# Patient Record
Sex: FEMALE | Race: WHITE | NOT HISPANIC OR LATINO | URBAN - METROPOLITAN AREA
[De-identification: names, ages, dates, MRNs, and addresses within clinical notes are randomized per-mention and may not be internally consistent; named-entity substitution may affect disease eponyms.]

---

## 2018-06-04 ENCOUNTER — EMERGENCY (EMERGENCY)
Facility: HOSPITAL | Age: 38
LOS: 1 days | Discharge: HOME | End: 2018-06-04
Attending: EMERGENCY MEDICINE | Admitting: EMERGENCY MEDICINE
Payer: COMMERCIAL

## 2018-06-04 VITALS
RESPIRATION RATE: 16 BRPM | DIASTOLIC BLOOD PRESSURE: 90 MMHG | WEIGHT: 132.28 LBS | SYSTOLIC BLOOD PRESSURE: 142 MMHG | OXYGEN SATURATION: 99 % | TEMPERATURE: 98 F | HEART RATE: 78 BPM | HEIGHT: 64 IN

## 2018-06-04 VITALS
DIASTOLIC BLOOD PRESSURE: 88 MMHG | RESPIRATION RATE: 18 BRPM | OXYGEN SATURATION: 98 % | HEART RATE: 72 BPM | TEMPERATURE: 98 F | SYSTOLIC BLOOD PRESSURE: 126 MMHG

## 2018-06-04 DIAGNOSIS — R10.9 UNSPECIFIED ABDOMINAL PAIN: ICD-10-CM

## 2018-06-04 DIAGNOSIS — Z79.899 OTHER LONG TERM (CURRENT) DRUG THERAPY: ICD-10-CM

## 2018-06-04 DIAGNOSIS — R10.31 RIGHT LOWER QUADRANT PAIN: ICD-10-CM

## 2018-06-04 DIAGNOSIS — R11.10 VOMITING, UNSPECIFIED: ICD-10-CM

## 2018-06-04 DIAGNOSIS — Z98.890 OTHER SPECIFIED POSTPROCEDURAL STATES: ICD-10-CM

## 2018-06-04 LAB
ALBUMIN SERPL ELPH-MCNC: 4.5 G/DL — SIGNIFICANT CHANGE UP (ref 3.3–5)
ALP SERPL-CCNC: 47 U/L — SIGNIFICANT CHANGE UP (ref 40–120)
ALT FLD-CCNC: 9 U/L — LOW (ref 10–45)
ANION GAP SERPL CALC-SCNC: 11 MMOL/L — SIGNIFICANT CHANGE UP (ref 5–17)
APPEARANCE UR: CLEAR — SIGNIFICANT CHANGE UP
APTT BLD: 32.7 SEC — SIGNIFICANT CHANGE UP (ref 27.5–37.4)
AST SERPL-CCNC: 24 U/L — SIGNIFICANT CHANGE UP (ref 10–40)
BASOPHILS NFR BLD AUTO: 0.1 % — SIGNIFICANT CHANGE UP (ref 0–2)
BILIRUB SERPL-MCNC: 0.2 MG/DL — SIGNIFICANT CHANGE UP (ref 0.2–1.2)
BILIRUB UR-MCNC: NEGATIVE — SIGNIFICANT CHANGE UP
BLD GP AB SCN SERPL QL: NEGATIVE — SIGNIFICANT CHANGE UP
BUN SERPL-MCNC: 7 MG/DL — SIGNIFICANT CHANGE UP (ref 7–23)
CALCIUM SERPL-MCNC: 9.8 MG/DL — SIGNIFICANT CHANGE UP (ref 8.4–10.5)
CHLORIDE SERPL-SCNC: 100 MMOL/L — SIGNIFICANT CHANGE UP (ref 96–108)
CO2 SERPL-SCNC: 27 MMOL/L — SIGNIFICANT CHANGE UP (ref 22–31)
COLOR SPEC: YELLOW — SIGNIFICANT CHANGE UP
CREAT SERPL-MCNC: 0.62 MG/DL — SIGNIFICANT CHANGE UP (ref 0.5–1.3)
DIFF PNL FLD: NEGATIVE — SIGNIFICANT CHANGE UP
EOSINOPHIL NFR BLD AUTO: 0.2 % — SIGNIFICANT CHANGE UP (ref 0–6)
GLUCOSE SERPL-MCNC: 115 MG/DL — HIGH (ref 70–99)
GLUCOSE UR QL: NEGATIVE — SIGNIFICANT CHANGE UP
HCT VFR BLD CALC: 41.7 % — SIGNIFICANT CHANGE UP (ref 34.5–45)
HGB BLD-MCNC: 13.7 G/DL — SIGNIFICANT CHANGE UP (ref 11.5–15.5)
INR BLD: 0.99 — SIGNIFICANT CHANGE UP (ref 0.88–1.16)
KETONES UR-MCNC: NEGATIVE — SIGNIFICANT CHANGE UP
LEUKOCYTE ESTERASE UR-ACNC: NEGATIVE — SIGNIFICANT CHANGE UP
LIDOCAIN IGE QN: 19 U/L — SIGNIFICANT CHANGE UP (ref 7–60)
LYMPHOCYTES # BLD AUTO: 9.9 % — LOW (ref 13–44)
MCHC RBC-ENTMCNC: 28.8 PG — SIGNIFICANT CHANGE UP (ref 27–34)
MCHC RBC-ENTMCNC: 32.9 G/DL — SIGNIFICANT CHANGE UP (ref 32–36)
MCV RBC AUTO: 87.8 FL — SIGNIFICANT CHANGE UP (ref 80–100)
MONOCYTES NFR BLD AUTO: 7.8 % — SIGNIFICANT CHANGE UP (ref 2–14)
NEUTROPHILS NFR BLD AUTO: 82 % — HIGH (ref 43–77)
NITRITE UR-MCNC: NEGATIVE — SIGNIFICANT CHANGE UP
PH UR: 7 — SIGNIFICANT CHANGE UP (ref 5–8)
PLATELET # BLD AUTO: 336 K/UL — SIGNIFICANT CHANGE UP (ref 150–400)
POTASSIUM SERPL-MCNC: 4.4 MMOL/L — SIGNIFICANT CHANGE UP (ref 3.5–5.3)
POTASSIUM SERPL-SCNC: 4.4 MMOL/L — SIGNIFICANT CHANGE UP (ref 3.5–5.3)
PROT SERPL-MCNC: 7.6 G/DL — SIGNIFICANT CHANGE UP (ref 6–8.3)
PROT UR-MCNC: 30 MG/DL
PROTHROM AB SERPL-ACNC: 11 SEC — SIGNIFICANT CHANGE UP (ref 9.8–12.7)
RBC # BLD: 4.75 M/UL — SIGNIFICANT CHANGE UP (ref 3.8–5.2)
RBC # FLD: 17.7 % — HIGH (ref 10.3–16.9)
RH IG SCN BLD-IMP: POSITIVE — SIGNIFICANT CHANGE UP
SODIUM SERPL-SCNC: 138 MMOL/L — SIGNIFICANT CHANGE UP (ref 135–145)
SP GR SPEC: 1.01 — SIGNIFICANT CHANGE UP (ref 1–1.03)
UROBILINOGEN FLD QL: 1 E.U./DL — SIGNIFICANT CHANGE UP
WBC # BLD: 12.6 K/UL — HIGH (ref 3.8–10.5)
WBC # FLD AUTO: 12.6 K/UL — HIGH (ref 3.8–10.5)

## 2018-06-04 PROCEDURE — 83690 ASSAY OF LIPASE: CPT

## 2018-06-04 PROCEDURE — 36415 COLL VENOUS BLD VENIPUNCTURE: CPT

## 2018-06-04 PROCEDURE — 85025 COMPLETE CBC W/AUTO DIFF WBC: CPT

## 2018-06-04 PROCEDURE — 96374 THER/PROPH/DIAG INJ IV PUSH: CPT | Mod: XU

## 2018-06-04 PROCEDURE — 99284 EMERGENCY DEPT VISIT MOD MDM: CPT | Mod: 25

## 2018-06-04 PROCEDURE — 86901 BLOOD TYPING SEROLOGIC RH(D): CPT

## 2018-06-04 PROCEDURE — 80053 COMPREHEN METABOLIC PANEL: CPT

## 2018-06-04 PROCEDURE — 86900 BLOOD TYPING SEROLOGIC ABO: CPT

## 2018-06-04 PROCEDURE — 86850 RBC ANTIBODY SCREEN: CPT

## 2018-06-04 PROCEDURE — 99285 EMERGENCY DEPT VISIT HI MDM: CPT

## 2018-06-04 PROCEDURE — 96375 TX/PRO/DX INJ NEW DRUG ADDON: CPT

## 2018-06-04 PROCEDURE — 74177 CT ABD & PELVIS W/CONTRAST: CPT | Mod: 26

## 2018-06-04 PROCEDURE — 85730 THROMBOPLASTIN TIME PARTIAL: CPT

## 2018-06-04 PROCEDURE — 85610 PROTHROMBIN TIME: CPT

## 2018-06-04 PROCEDURE — 74177 CT ABD & PELVIS W/CONTRAST: CPT

## 2018-06-04 PROCEDURE — 96376 TX/PRO/DX INJ SAME DRUG ADON: CPT

## 2018-06-04 PROCEDURE — 81001 URINALYSIS AUTO W/SCOPE: CPT

## 2018-06-04 RX ORDER — MORPHINE SULFATE 50 MG/1
4 CAPSULE, EXTENDED RELEASE ORAL ONCE
Qty: 0 | Refills: 0 | Status: DISCONTINUED | OUTPATIENT
Start: 2018-06-04 | End: 2018-06-04

## 2018-06-04 RX ORDER — SODIUM CHLORIDE 9 MG/ML
1000 INJECTION INTRAMUSCULAR; INTRAVENOUS; SUBCUTANEOUS
Qty: 0 | Refills: 0 | Status: DISCONTINUED | OUTPATIENT
Start: 2018-06-04 | End: 2018-06-08

## 2018-06-04 RX ORDER — SODIUM CHLORIDE 9 MG/ML
1000 INJECTION INTRAMUSCULAR; INTRAVENOUS; SUBCUTANEOUS ONCE
Qty: 0 | Refills: 0 | Status: COMPLETED | OUTPATIENT
Start: 2018-06-04 | End: 2018-06-04

## 2018-06-04 RX ORDER — IOHEXOL 300 MG/ML
50 INJECTION, SOLUTION INTRAVENOUS ONCE
Qty: 0 | Refills: 0 | Status: COMPLETED | OUTPATIENT
Start: 2018-06-04 | End: 2018-06-04

## 2018-06-04 RX ORDER — ACETAMINOPHEN 500 MG
650 TABLET ORAL ONCE
Qty: 0 | Refills: 0 | Status: COMPLETED | OUTPATIENT
Start: 2018-06-04 | End: 2018-06-04

## 2018-06-04 RX ORDER — ONDANSETRON 8 MG/1
4 TABLET, FILM COATED ORAL ONCE
Qty: 0 | Refills: 0 | Status: COMPLETED | OUTPATIENT
Start: 2018-06-04 | End: 2018-06-04

## 2018-06-04 RX ORDER — TAMSULOSIN HYDROCHLORIDE 0.4 MG/1
1 CAPSULE ORAL
Qty: 7 | Refills: 0 | OUTPATIENT
Start: 2018-06-04

## 2018-06-04 RX ADMIN — IOHEXOL 50 MILLILITER(S): 300 INJECTION, SOLUTION INTRAVENOUS at 12:25

## 2018-06-04 RX ADMIN — MORPHINE SULFATE 4 MILLIGRAM(S): 50 CAPSULE, EXTENDED RELEASE ORAL at 12:25

## 2018-06-04 RX ADMIN — ONDANSETRON 4 MILLIGRAM(S): 8 TABLET, FILM COATED ORAL at 12:25

## 2018-06-04 RX ADMIN — SODIUM CHLORIDE 200 MILLILITER(S): 9 INJECTION INTRAMUSCULAR; INTRAVENOUS; SUBCUTANEOUS at 22:34

## 2018-06-04 RX ADMIN — Medication 650 MILLIGRAM(S): at 20:57

## 2018-06-04 RX ADMIN — MORPHINE SULFATE 4 MILLIGRAM(S): 50 CAPSULE, EXTENDED RELEASE ORAL at 16:00

## 2018-06-04 RX ADMIN — MORPHINE SULFATE 4 MILLIGRAM(S): 50 CAPSULE, EXTENDED RELEASE ORAL at 13:00

## 2018-06-04 RX ADMIN — SODIUM CHLORIDE 1000 MILLILITER(S): 9 INJECTION INTRAMUSCULAR; INTRAVENOUS; SUBCUTANEOUS at 19:28

## 2018-06-04 RX ADMIN — Medication 650 MILLIGRAM(S): at 21:45

## 2018-06-04 RX ADMIN — SODIUM CHLORIDE 2000 MILLILITER(S): 9 INJECTION INTRAMUSCULAR; INTRAVENOUS; SUBCUTANEOUS at 12:25

## 2018-06-04 RX ADMIN — MORPHINE SULFATE 4 MILLIGRAM(S): 50 CAPSULE, EXTENDED RELEASE ORAL at 14:35

## 2018-06-04 NOTE — ED PROVIDER NOTE - MEDICAL DECISION MAKING DETAILS
Suspicious for acute appendicitis.  Pregnancy test negative.  Plan: Labs and CT scan, likely surgical evaluation.

## 2018-06-04 NOTE — ED PROVIDER NOTE - PHYSICAL EXAMINATION
CONSTITUTIONAL: WD,WN. NAD.    SKIN: Normal color and turgor. No rash.    HEAD: NC/AT.  EYES: Conjunctiva clear. EOMI. PERRL.    ENT: Airway patent, OP without erythema, tonsillar swelling or exudate; uvula midline without swelling. Nasal mucosa clear, no rhinorrhea.   RESPIRATORY:  Breathing non-labored. No retractions or accessory muscle use.  Lungs CTA bilat.  CARDIOVASCULAR:  RRR, S1S2. No M/R/G.      GI:  BS normoactive.  Abdomen focally tender in RLQ; + obturator and Rovsing signs present, + rebound tenderness.    : No CVAT.  MSK: Neck supple with painless ROM.  No extremity edema or tenderness.  No joint swelling or ROM limitation.  NEURO: Alert and oriented; CN II-XII grossly intact. Speech clear. 5/5 strength in all extremities.  Normal balance and gait.

## 2018-06-04 NOTE — CONSULT NOTE ADULT - SUBJECTIVE AND OBJECTIVE BOX
CONSULT NOTE:    Patient is a 38y old  Female who presents with a chief complaint of RLQ pain x1 day    HPI: 39 y/o female presenting to the ED c/o RLQ pain radiating to back x1 day. Pt reports the pain as sharp, constant and 9/10 at max. She states her friend gave her a medication for the pain yesterday without relief. When pain is at maximum she feels nauseous with several episodes of vomiting. Denies fever/chills, hematuria, dysuria, sob, malaise.       Vital Signs Last 24 Hrs  T(C): 37.1 (04 Jun 2018 20:50), Max: 37.1 (04 Jun 2018 20:50)  T(F): 98.8 (04 Jun 2018 20:50), Max: 98.8 (04 Jun 2018 20:50)  HR: 67 (04 Jun 2018 20:50) (67 - 78)  BP: 99/64 (04 Jun 2018 20:50) (99/64 - 142/90)  BP(mean): --  RR: 16 (04 Jun 2018 20:50) (16 - 16)  SpO2: 98% (04 Jun 2018 20:50) (98% - 99%)  I&O's Summary      PE:  Gen: NAD  Abd: soft, ntnd  : voiding without difficulty, no CVAT b/l    LABS:                        13.7   12.6  )-----------( 336      ( 04 Jun 2018 12:12 )             41.7     06-04    138  |  100  |  7   ----------------------------<  115<H>  4.4   |  27  |  0.62    Ca    9.8      04 Jun 2018 12:12    TPro  7.6  /  Alb  4.5  /  TBili  0.2  /  DBili  x   /  AST  24  /  ALT  9<L>  /  AlkPhos  47  06-04    PT/INR - ( 04 Jun 2018 12:12 )   PT: 11.0 sec;   INR: 0.99          PTT - ( 04 Jun 2018 12:12 )  PTT:32.7 sec  Cultures      A/P: pt is a 39 y/o female presenting to the ED c/o constant, sharp RLQ pain radiating to back x1 day.   CT: 5mm UVJ stone on right. 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 VSS, Creat nml.  No urological intervention necessary at this time. Hemodynamically stable for discharge with f/u urologist at home in [facility].   -Flomax, Percocet

## 2018-06-04 NOTE — ED PROVIDER NOTE - PROGRESS NOTE DETAILS
pt with persistent and worsening RLQ pain.  she seems slightly more distended in the abdomen than earlier.  CT scan showing both a cecal bascule and a 5 mm right sided ureteral stone with hydronephrosis.  will have surgery consult. Surgery has seen pt, cleared from their standpoint. They do not think this is a bascule, and pt is not obstructed.  Pt currently has very mild discomfort, rated 2 out of 10.  No nausea or vomiting.  Has not had pain meds in 9 hours.  Has 5 mm right UVJ stone, no signs of infection on UA, and she is afebrile with normal renal function.  Tolerating PO fluids.   Pt will be DC home to her hotel, was given strict return precautions.  Advised to not fly home to [facility] [place] until the issue has completely resolved. Seen by urology and cleared for DC home on Flomax and percocet.  urology follow up at home in [facility].  Paper prescriptions written due to international traveler.

## 2018-06-04 NOTE — ED PROVIDER NOTE - OBJECTIVE STATEMENT
pt here on business from [facility], began to have periumbilical abdominal cramping and discomfort apx 24 hours ago,  Had several episodes of NBNB emesis, no diarrhea.  This morning woke around 2 am with migration of pain to RLQ.  Pain is constant, not worsened or relieved by anything.   Has lost her appetite.  No fever or chills.  No urinary symptoms.  LMP apx 3 wks ago, has copper T IUD.   PMHx GERD  PSHx  apx 5 yrs ago  Meds Nexium  NKA  Soc: rare alcohol, no tobacco or drug use

## 2018-06-04 NOTE — ED PROVIDER NOTE - ATTENDING CONTRIBUTION TO CARE
39 F hx of gerd w abd pain- periumbilical about 24 hrs ago  this am awoke w RLQ pain  no appetite + vomiting  no diarrhea  no urinary sx  vss  s1s2 lungs cta bl  abd + RLQ ttp  IMP-  RLQ pain, check labs, CT abd pelvis

## 2018-06-05 NOTE — CONSULT NOTE ADULT - SUBJECTIVE AND OBJECTIVE BOX
GENERAL SURGERY CONSULT NOTE    HPI: 38y F w/ h/o GERD & anemia requiring transfusion who presented to [facility] ED 6/ w/ acute onset right kaleb-abdominal pain a/w nausea/vomiting due to pain severity of pain. Denies fevers however reports chills. Continues passing BMs today. Denies any dysuria, hematuria, urinary frequency or urgency. No h/o nephrolithiasis. In the ED, pt remains afebrile & hemodynamically stable w/ WBC 12.6, 82% PMNs. CT A/P showed 0.5cm stone at R UVJ causing moderate hydroureteronephrosis and delayed function of the upper pole moiety of a duplicated right renal collecting system. Also showed c/f cecal bascule.     PMHx: GERD, anemia    PSHx: .    ROS: Pertinent positives/negatives noted in HPI.     HOME MEDS: Nexium for GERD    ALLERGIES: NKDA    SocHx: Never smoker. Social EtOH. No drug use.     FHx: No pertinent history in first degree relatives.      T(C): 36.7 (18 @ 23:40), Max: 37.1 (18 @ 20:50)  HR: 72 (18 @ 23:40) (67 - 78)  BP: 126/88 (18 @ 23:40) (99/64 - 142/90)  RR: 18 (18 @ 23:40) (16 - 18)  SpO2: 98% (18 @ 23:40) (98% - 99%)    PHYSICAL EXAM:  GEN: Non-toxic, resting comfortably in ED exam chair  NEURO: A&Ox3, NAD, ALVAREZ.  CV: RRR, S1&S2.   PULM: CTAB, no increased WOB.  ABD: Soft, non-distended, minimally tender to palpation in R hemiabdomen. No rebound or guarding.   EXTR: WWP. No peripheral edema.    LABS:                        13.7   12.6  )-----------( 336      ( 2018 12:12 )             41.7     06-    138  |  100  |  7   ----------------------------<  115<H>  4.4   |  27  |  0.62    Ca    9.8      2018 12:12    TPro  7.6  /  Alb  4.5  /  TBili  0.2  /  DBili  x   /  AST  24  /  ALT  9<L>  /  AlkPhos  47  -04    PT/INR - ( 2018 12:12 )   PT: 11.0 sec;   INR: 0.99          PTT - ( 2018 12:12 )  PTT:32.7 sec    Urinalysis Basic - ( 2018 12:12 )    Color: Yellow / Appearance: Clear / S.015 / pH: x  Gluc: x / Ketone: NEGATIVE  / Bili: Negative / Urobili: 1.0 E.U./dL   Blood: x / Protein: 30 mg/dL / Nitrite: NEGATIVE   Leuk Esterase: NEGATIVE / RBC: < 5 /HPF / WBC < 5 /HPF   Sq Epi: x / Non Sq Epi: Moderate /HPF / Bacteria: Present /HPF    CT Abdomen and Pelvis w/ Oral Cont and w/ IV Cont (18 @ 15:29) >    FINDINGS: Images of the lower chest are unremarkable.    Liver unremarkable. No radiopaque stones gallbladder. Spleen, pancreas,   adrenal glands, and left kidney unremarkable. There is a 0.5 cm stone   with a density of 500 Hounsfield units located at the right   ureterovesical junction. This stone causesmoderate right   hydroureteronephrosis involving the upper pole moiety of a duplicated   right renal collecting system. There is also delayed function of the   upper pole moiety of the right kidney. There is infiltration of the right   perinephric fat.    No lymphadenopathy in abdomen or pelvis. Small pelvic ascites.    Examination gastrointestinal tract demonstrates unremarkable stomach and   small bowel. There is a cecal bascule. The appendix is not visualized,   but there is no evidence of appendicitis. Tiny fat-containing umbilical   hernia.    IUD present within uterus. Ovaries unremarkable.    There is left sacroiliitis. 1.4 x 0.6 cm sclerotic lesion left ilium is   probably bone island.    IMPRESSION: 1. There is a 0.5 cm stone at the right ureterovesical   junction causing moderate hydroureteronephrosis and delayed function of   the upper pole moiety of a duplicated right renal collecting system.    2. Appendix not visualized, but no evidence of appendicitis.    3. Cecal bascule.

## 2018-06-05 NOTE — CONSULT NOTE ADULT - ATTENDING COMMENTS
Patient seen just prior to discharge, CT reviewed.  Cecal bascule without obstruction.  Reviewed with patient.